# Patient Record
Sex: FEMALE | URBAN - METROPOLITAN AREA
[De-identification: names, ages, dates, MRNs, and addresses within clinical notes are randomized per-mention and may not be internally consistent; named-entity substitution may affect disease eponyms.]

---

## 2024-07-29 ENCOUNTER — APPOINTMENT (OUTPATIENT)
Dept: URBAN - METROPOLITAN AREA CLINIC 306 | Age: 24
Setting detail: DERMATOLOGY
End: 2024-07-30

## 2024-07-29 DIAGNOSIS — L30.9 DERMATITIS, UNSPECIFIED: ICD-10-CM

## 2024-07-29 PROCEDURE — OTHER PRESCRIPTION: OTHER

## 2024-07-29 PROCEDURE — 99202 OFFICE O/P NEW SF 15 MIN: CPT

## 2024-07-29 PROCEDURE — OTHER COUNSELING: OTHER

## 2024-07-29 PROCEDURE — OTHER ADDITIONAL NOTES: OTHER

## 2024-07-29 PROCEDURE — OTHER MIPS QUALITY: OTHER

## 2024-07-29 RX ORDER — TRIAMCINOLONE ACETONIDE 1 MG/G
CREAM TOPICAL BID
Qty: 454 | Refills: 3 | Status: ERX | COMMUNITY
Start: 2024-07-29

## 2024-07-29 ASSESSMENT — LOCATION SIMPLE DESCRIPTION DERM
LOCATION SIMPLE: LEFT THIGH
LOCATION SIMPLE: ABDOMEN
LOCATION SIMPLE: RIGHT THIGH

## 2024-07-29 ASSESSMENT — LOCATION DETAILED DESCRIPTION DERM
LOCATION DETAILED: RIGHT RIB CAGE
LOCATION DETAILED: LEFT ANTERIOR PROXIMAL THIGH
LOCATION DETAILED: EPIGASTRIC SKIN
LOCATION DETAILED: LEFT LATERAL ABDOMEN
LOCATION DETAILED: RIGHT ANTERIOR PROXIMAL THIGH

## 2024-07-29 ASSESSMENT — LOCATION ZONE DERM
LOCATION ZONE: LEG
LOCATION ZONE: TRUNK

## 2024-08-15 NOTE — PROCEDURE: COUNSELING
Neisha Graham is a 63 y.o. female on day 1 of admission presenting with Cellulitis.    Subjective   Interval History: no fever, no new complaints        Review of Systems    Objective   Range of Vitals (last 24 hours)  Heart Rate:  [91-94]   Temp:  [36.2 °C (97.2 °F)-36.5 °C (97.7 °F)]   Resp:  [18-24]   BP: (133-164)/(67-83)   SpO2:  [89 %-96 %]   Daily Weight  08/13/24 : (!) 152 kg (335 lb)    Body mass index is 57.5 kg/m².    Physical Exam  Constitutional:       Appearance: Normal appearance.   HENT:      Head: Normocephalic and atraumatic.      Mouth/Throat:      Mouth: Mucous membranes are moist.      Pharynx: Oropharynx is clear.   Eyes:      Pupils: Pupils are equal, round, and reactive to light.   Cardiovascular:      Rate and Rhythm: Normal rate and regular rhythm.      Heart sounds: Normal heart sounds.   Pulmonary:      Effort: Pulmonary effort is normal.      Breath sounds: Normal breath sounds.   Abdominal:      General: Abdomen is flat. Bowel sounds are normal.      Palpations: Abdomen is soft.   Musculoskeletal:      Cervical back: Normal range of motion.      Comments: Stasis, wounds   Neurological:      Mental Status: She is alert.         Antibiotics  ceFAZolin - 1 gram/50 mL  cephalexin - 500 mg    Relevant Results  Labs  Results from last 72 hours   Lab Units 08/15/24  0639 08/14/24  0739 08/13/24  0735 08/12/24  2057   WBC AUTO x10*3/uL 6.7 8.8 9.5 10.9   HEMOGLOBIN g/dL 14.4 14.1 15.8 16.6*   HEMATOCRIT % 44.5 43.7 49.2* 48.3*   PLATELETS AUTO x10*3/uL 420 425 478* 544*   NEUTROS PCT AUTO %  --   --   --  67.9   LYMPHS PCT AUTO %  --   --   --  14.9   MONOS PCT AUTO %  --   --   --  14.6   EOS PCT AUTO %  --   --   --  1.2     Results from last 72 hours   Lab Units 08/15/24  0639 08/14/24  0739 08/13/24  0735   SODIUM mmol/L 136 135* 132*   POTASSIUM mmol/L 4.9 4.8 5.2   CHLORIDE mmol/L 105 105 100   CO2 mmol/L 25 25 25   BUN mg/dL 14 17 27*   CREATININE mg/dL 0.64 0.75 0.84   GLUCOSE mg/dL  91 103* 149*   CALCIUM mg/dL 7.6* 7.7* 8.6   ANION GAP mmol/L 11 10 12   EGFR mL/min/1.73m*2 >90 88 77     Results from last 72 hours   Lab Units 08/12/24  2246   ALK PHOS U/L 206*   BILIRUBIN TOTAL mg/dL 0.4   PROTEIN TOTAL g/dL 6.8   ALT U/L 19   AST U/L 22   ALBUMIN g/dL 3.7     Estimated Creatinine Clearance: 125 mL/min (by C-G formula based on SCr of 0.64 mg/dL).  C-Reactive Protein   Date Value Ref Range Status   08/12/2024 7.94 (H) <1.00 mg/dL Final     CRP   Date Value Ref Range Status   06/19/2020 1.84 (A) mg/dL Final     Comment:     REF VALUE  < 1.00       Microbiology  Reviewed  Imaging  Reviewed          Assessment/Plan     Legs stasis / possible cellulitis    Recommendations :  Plan on oral Keflex with discharge  Needs to elevate her legs and not sleep in the chair  Discussed with the medical team     I spent minutes in the professional and overall care of this patient.      Gorge Myers MD   Detail Level: Detailed